# Patient Record
Sex: FEMALE | Race: WHITE | NOT HISPANIC OR LATINO | Employment: UNEMPLOYED | URBAN - NONMETROPOLITAN AREA
[De-identification: names, ages, dates, MRNs, and addresses within clinical notes are randomized per-mention and may not be internally consistent; named-entity substitution may affect disease eponyms.]

---

## 2017-04-19 ENCOUNTER — TRANSCRIBE ORDERS (OUTPATIENT)
Dept: PHYSICAL THERAPY | Facility: HOSPITAL | Age: 42
End: 2017-04-19

## 2017-04-19 DIAGNOSIS — M25.561 ACUTE PAIN OF RIGHT KNEE: Primary | ICD-10-CM

## 2017-04-26 ENCOUNTER — TRANSCRIBE ORDERS (OUTPATIENT)
Dept: PHYSICAL THERAPY | Facility: HOSPITAL | Age: 42
End: 2017-04-26

## 2017-04-26 DIAGNOSIS — M25.561 ACUTE PAIN OF RIGHT KNEE: Primary | ICD-10-CM

## 2017-04-27 ENCOUNTER — HOSPITAL ENCOUNTER (OUTPATIENT)
Dept: PHYSICAL THERAPY | Facility: HOSPITAL | Age: 42
Setting detail: THERAPIES SERIES
Discharge: HOME | End: 2017-04-27

## 2017-04-27 DIAGNOSIS — Z96.651 STATUS POST TOTAL RIGHT KNEE REPLACEMENT: Primary | ICD-10-CM

## 2017-04-27 DIAGNOSIS — M25.561 ACUTE PAIN OF RIGHT KNEE: ICD-10-CM

## 2017-04-27 PROCEDURE — 97110 THERAPEUTIC EXERCISES: CPT | Performed by: PHYSICAL THERAPIST

## 2017-04-27 PROCEDURE — G0283 ELEC STIM OTHER THAN WOUND: HCPCS | Performed by: PHYSICAL THERAPIST

## 2017-04-27 PROCEDURE — 97163 PT EVAL HIGH COMPLEX 45 MIN: CPT | Performed by: PHYSICAL THERAPIST
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
## 2017-04-28 ENCOUNTER — HOSPITAL ENCOUNTER (OUTPATIENT)
Dept: PHYSICAL THERAPY | Facility: HOSPITAL | Age: 42
Setting detail: THERAPIES SERIES
Discharge: HOME | End: 2017-04-28

## 2017-04-28 DIAGNOSIS — M25.561 ACUTE PAIN OF RIGHT KNEE: ICD-10-CM

## 2017-04-28 DIAGNOSIS — Z96.651 STATUS POST TOTAL RIGHT KNEE REPLACEMENT: Primary | ICD-10-CM

## 2017-04-28 PROCEDURE — G0283 ELEC STIM OTHER THAN WOUND: HCPCS | Performed by: SPECIALIST/TECHNOLOGIST

## 2017-04-28 PROCEDURE — 97110 THERAPEUTIC EXERCISES: CPT | Performed by: SPECIALIST/TECHNOLOGIST

## 2017-04-28 NOTE — PROGRESS NOTES
"    Outpatient Physical Therapy Ortho Treatment Note  [facility]     Patient Name: Caren Cuevas  : 1975  MRN: [id number]  Today's Date: 2017      Visit Date: 2017   Patients reports pain as:  8/10   Patient reports overall % improvement as:  \"a little\"   Patients attendance has been:  2/2   Insurance visits available   6 visits   Recert Date is:  17   Next MD visit is:  TBD       Visit Dx:    ICD-10-CM ICD-9-CM   1. Status post total right knee replacement Z96.651 V43.65   2. Acute pain of right knee M25.561 719.46       There is no problem list on file for this patient.       Past Medical History:   Diagnosis Date   • Anxiety    • Asthma    • Depression    • Diabetes mellitus    • High cholesterol    • Hypertension    • PTSD (post-traumatic stress disorder)         Past Surgical History:   Procedure Laterality Date   • CARPAL TUNNEL RELEASE Right    • CHOLECYSTECTOMY     • HAND SURGERY Left     L ligament repair   • HYSTERECTOMY     • KNEE SURGERY Right     x17+ for the R leg   • TOTAL KNEE ARTHROPLASTY Right 2017             PT Ortho       17 1100    Subjective Comments    Subjective Comments (P)  Horrible today. took pain pump out    -ML    Precautions and Contraindications    Precautions (P)  R fused ankle  -ML    Contraindications (P)  LATEX ALLERGY  -ML    Subjective Pain    Able to rate subjective pain? (P)  yes  -ML    Pre-Treatment Pain Level (P)  8  -ML    Right Knee    Extension/Flexion AROM (P)  --   -8° to 58°  -ML    Gait Assessment/Treatment    Gait, Assistive Device (P)  rolling platform walker  -ML    Gait, Gait Deviations (P)  right:;antalgic  -ML    Gait, Comment (P)  very slow guarded gait.  -ML      17 1100    Subjective Comments    Subjective Comments See patient history  -AJ    Precautions and Contraindications    Precautions R fused ankle  -AJ    Contraindications LATEX ALLERGY  -AJ    Subjective Pain    Able " to rate subjective pain? yes  -    Pre-Treatment Pain Level 6  -    Post-Treatment Pain Level 6  -    Posture/Observations    Alignment Options Genu varus  -AJ    Genu varus Right:;Moderate;Increased;Left:;Normal  -    Observations Incision healing;Edema;Ecchymosis/bruising   mild redness at medial knee  -    Posture/Observations Comments Mild distress, nerve block in place.  -    Sensation    Sensation WNL? WFL  -AJ    Light Touch Partial deficits in the RLE  -AJ    Sharp/Dull Partial deficits in the RLE  -AJ    Additional Comments Some descrese in sensation in areas of the R LE due to nerve dmamge.  -    Special Tests/Palpation    Special Tests/Palpation --   Global TTP in R knee  -AJ    Left Knee    Extension/Flexion AROM --   0°-135°  -AJ    Right Knee    Extension/Flexion AROM --   -19°-52°  -AJ    MMT (Manual Muscle Testing)    General MMT Assessment Detail Unable to SLR, grossly 2+/5 to 3-/5 in available range for R LE  -AJ    Lower Extremity    Lower Ext Manual Muscle Testing Detail L LE 5/5  -    Lower Extremity Flexibility    Hamstrings Right:;Severely limited;Left:;Mildly limited  -    Pathomechanics    Lower Extremity Pathomechanics Antalgic with midstance;Limited knees flexion with swing through;Excessive hip external rotation   R fused ankle, amkes foot flat type gait also.  -    Transfers    Transfer, Comment I with transfers, uses L LE, under R to raise leg.  -    Gait Assessment/Treatment    Gait, [place] Level conditional independence  -    Gait, Assistive Device rolling walker  -    Gait, Distance (Feet) 100  -    Gait, Gait Pattern Analysis swing-to gait  -    Gait, Gait Deviations right:;antalgic;gerber decreased;decreased heel strike;step length decreased;stride length decreased;leans to the left  -    Gait, Safety Issues weight-shifting ability decreased;step length decreased  -    Gait, Impairments pain;ROM decreased;decreased flexibility  -    Gait,  Comment Slow guarded gait, walker adjusted to correct height.  -AJ    Stairs Assessment/Treatment    Stairs, Comment Not tested.  -AJ      User Key  (r) = Recorded By, (t) = Taken By, (c) = Cosigned By    Initials Name Provider Type    МАРИНА Willams, PT Physical Therapist    ML Cesar Stanford, ATC                             PT Assessment/Plan       04/28/17 1100 04/27/17 1800    PT Assessment    Functional Limitations  Impaired gait;Impaired locomotion;Limitation in home management;Limitations in community activities;Performance in leisure activities;Performance in self-care ADL  -AJ    Impairments  Balance;Edema;Endurance;Gait;Impaired flexibility;Impaired muscle endurance;Impaired muscle length;Impaired muscle power;Posture;Poor body mechanics;Pain;Muscle strength;Motor function;Locomotion;Joint mobility;Joint integrity;Integumentary integrity;Sensation  -AJ    Assessment Comments (P)  pt did great improved ROM.  able to orin > wb stance   unable to perform supine SLR flex  -ML Patient id well with al lther ex and given written copy og HEP exercises.  -AJ    Rehab Potential  Fair  -AJ    Patient/caregiver participated in establishment of treatment plan and goals  Yes  -AJ    Patient would benefit from skilled therapy intervention  Yes  -AJ    PT Plan    PT Frequency (P)  5x/week  -ML 5x/week;3x/week;2x/week   5x/week for 2 weeks, 3x/week for 2 weeks, then 2x/wk  -AJ    Predicted Duration of Therapy Intervention (days/wks)  6-8 weeks  -AJ    Planned CPT's?  PT EVAL HIGH COMPLEXITY: 58413;PT RE-EVAL: 12543;PT THER PROC EA 15 MIN: 21593;PT THER ACT EA 15 MIN: 61710;PT MANUAL THERAPY EA 15 MIN: 93794;PT ELECTRICAL STIM UNATTEND: ;PT THER SUPP EA 15 MIN  -AJ    Physical Therapy Interventions (Optional Details)  aquatics exercise;balance training;bandaging;gait training;gross motor skills;home exercise program;joint mobilization;lumbar stabilization;modalities;manual therapy  techniques;neuromuscular re-education;orthotic fitting/training;patient/family education;postural re-education;ROM (Range of Motion);strengthening;stretching;stair training  -    PT Plan Comments (P)  progress ther-ex  -ML Progress ther ex for ROM and strength.  -      User Key  (r) = Recorded By, (t) = Taken By, (c) = Cosigned By    Initials Name Provider Type     Fide Willams, PT Physical Therapist     Ceasr Stanford, [facility]                 Modalities       04/28/17 1100          Ice    Ice Applied (P)  Yes  -ML      Location (P)  R- ant knee  -ML      Ice S/P Rx (P)  Yes  -ML      ELECTRICAL STIMULATION    Attended/Unattended (P)  Unattended  -ML      Stimulation Type (P)  IFC  -ML      Location/Electrode Placement/Other (P)  R-knee  -ML      Rx Minutes (P)  20 mins  -ML        User Key  (r) = Recorded By, (t) = Taken By, (c) = Cosigned By    Initials Name Provider Type     Cesar Stanford, [facility]                 Exercises       04/28/17 1100          Subjective Comments    Subjective Comments (P)  Horrible today. took pain pump out    -ML      Subjective Pain    Able to rate subjective pain? (P)  yes  -ML      Pre-Treatment Pain Level (P)  8  -ML      Post-Treatment Pain Level (P)  6  -ML      Exercise 1    Exercise Name 1 (P)  pro2 (rocks) s=6 LE  -ML      Time (Minutes) 1 (P)  8 min.  -ML      Exercise 2    Exercise Name 2 (P)  Heel slides w/ board & strap  -ML      Time (Minutes) 2 (P)  5 min  -ML      Exercise 3    Exercise Name 3 (P)  heel prop w/ QS  -ML      Time (Minutes) 3 (P)  5 min  -ML      Exercise 4    Exercise Name 4 (P)  Add sets w/ pillow  -ML      Reps 4 (P)  20  -ML      Time (Seconds) 4 (P)  5  sec hold  -ML      Exercise 5    Exercise Name 5 (P)  Ham sets  -ML      Reps 5 (P)  20  -ML      Time (Seconds) 5 (P)  5 sec hold  -ML      Exercise 6    Exercise Name 6 (P)  St. w/ R.W. wt shifts  -ML      Time (Minutes) 6 (P)  3 min  -ML      Exercise 7     Exercise Name 7 (P)  St. w/ R.W. alt low march  -ML      Reps 7 (P)  20  -ML      Exercise 8    Exercise Name 8 (P)  St. w/ R.W. alt hip abd  -ML      Reps 8 (P)  10  -ML        User Key  (r) = Recorded By, (t) = Taken By, (c) = Cosigned By    Initials Name Provider Type    ISABEL Stanford ATC                                PT OP Goals       04/28/17 1100       PT Short Term Goals    STG Date to Achieve (P)  05/18/17  -ML     STG 1 (P)  I with HEP and have additions/changes by next recertification.  -ML     STG 2 (P)  AROM R Knee -10° from extension.  -ML     STG 3 (P)  MALLY R knee flexion 90° or greater.  -ML     STG 4 (P)  patient able to perform SLR 20 consecutative times no lag present.  -ML     STG 5 (P)  Ambulate with RW or SC more fluid and with increased gerber.  -ML     Long Term Goals    LTG Date to Achieve (P)  06/23/17  -ML     LTG 1 (P)  AROM R knee -5° or less extension.  -ML     LTG 2 (P)  AROM R knee 115° of flexion.  -ML     LTG 3 (P)  R LE 4+/5 or greater for all directions.  -ML     LTG 4 (P)  Able to ambulate with SC or No assistive device with only gait deviation from fused ankle.  -ML     LTG 5 (P)  Able to ambulate up/down 3 steps in safest way possible x5 HRA for balance.  -ML     LTG 6 (P)  Patient able to perform sit to/from stand x5, no UE A in less than 30 seconds.  -ML     LTG 7 (P)  I with final HEP.  -ML     LTG 8 (P)  D/C with free 30day fitness formula membership.  -ML     Time Calculation    PT Goal Re-Cert Due Date (P)  05/18/17  -ML       User Key  (r) = Recorded By, (t) = Taken By, (c) = Cosigned By    Initials Name Provider Type    ISABEL Stanford ATC                 Therapy Education       04/27/17 1800          Therapy Education    Given HEP;Symptoms/condition management;Pain management;Posture/body mechanics   Plan of Care  -AJ      Program New  -AJ      How Provided Verbal;Demonstration;Written  -AJ      Provided to Patient  -AJ       Level of Understanding Verbalized;Demonstrated  -AJ        User Key  (r) = Recorded By, (t) = Taken By, (c) = Cosigned By    Initials Name Provider Type    МАРИНА Willams, PT Physical Therapist                Outcome Measures       04/27/17 1800          Lower Extremity Functional Index    Any of your usual work, housework or school activities 0  -AJ      Your usual hobbies, recreational or sporting activities 1  -AJ      Getting into or out of the bath 1  -AJ      Walking between rooms 2  -AJ      Putting on your shoes or socks 1  -AJ      Squatting 0  -AJ      Lifting an object, like a bag of groceries from the floor 2  -AJ      Performing light activities around your home 1  -AJ      Performing heavy activities around your home 0  -AJ      Getting into or out of a car 1  -AJ      Walking 2 blocks 0  -AJ      Walking a mile 0  -AJ      Going up or down 10 stairs (about 1 flight of stairs) 0  -AJ      Standing for 1 hour 0  -AJ      Sitting for 1 hour 0  -AJ      Running on even ground 0  -AJ      Running on uneven ground 0  -AJ      Making sharp turns while running fast 0  -AJ      Hopping 0  -AJ      Rolling over in bed 1  -AJ      Total 10  -AJ      Functional Assessment    Outcome Measure Options Lower Extremity Functional Scale (LEFS)  -AJ        User Key  (r) = Recorded By, (t) = Taken By, (c) = Cosigned By    Initials Name Provider Type    МАРИНА Willams, PT Physical Therapist            Time Calculation:   Start Time: (P) 1102  Stop Time: (P) 1205  Time Calculation (min): (P) 63 min  Total Timed Code Minutes- PT: (P) 38 minute(s)    Therapy Charges for Today     Code Description Service Date Service Provider Modifiers Qty    94562537975 HC PT THER PROC EA 15 MIN 4/28/2017 Cesar Stanford, ATC  3    79658091559 HC PT THER SUPP EA 15 MIN 4/28/2017 Cesar Stanford, ATC  1    56403519546 HC PT ELECTRICAL STIM UNATTENDED 4/28/2017 Cesar Stanford, ATC  1                    Cesar Irizarry  Abdoulaye, ATC  4/28/2017

## 2017-05-01 ENCOUNTER — HOSPITAL ENCOUNTER (OUTPATIENT)
Dept: PHYSICAL THERAPY | Facility: HOSPITAL | Age: 42
Setting detail: THERAPIES SERIES
Discharge: HOME | End: 2017-05-01

## 2017-05-01 DIAGNOSIS — M25.561 ACUTE PAIN OF RIGHT KNEE: ICD-10-CM

## 2017-05-01 DIAGNOSIS — Z96.651 STATUS POST TOTAL RIGHT KNEE REPLACEMENT: Primary | ICD-10-CM

## 2017-05-01 PROCEDURE — 97110 THERAPEUTIC EXERCISES: CPT | Performed by: PHYSICAL THERAPIST

## 2017-05-01 PROCEDURE — G0283 ELEC STIM OTHER THAN WOUND: HCPCS | Performed by: PHYSICAL THERAPIST

## 2017-05-02 ENCOUNTER — APPOINTMENT (OUTPATIENT)
Dept: PHYSICAL THERAPY | Facility: HOSPITAL | Age: 42
End: 2017-05-02

## 2017-05-03 ENCOUNTER — HOSPITAL ENCOUNTER (OUTPATIENT)
Dept: PHYSICAL THERAPY | Facility: HOSPITAL | Age: 42
Setting detail: THERAPIES SERIES
Discharge: HOME | End: 2017-05-03

## 2017-05-03 DIAGNOSIS — Z96.651 STATUS POST TOTAL RIGHT KNEE REPLACEMENT: Primary | ICD-10-CM

## 2017-05-03 DIAGNOSIS — M25.561 ACUTE PAIN OF RIGHT KNEE: ICD-10-CM

## 2017-05-03 PROCEDURE — G0283 ELEC STIM OTHER THAN WOUND: HCPCS

## 2017-05-03 PROCEDURE — 97110 THERAPEUTIC EXERCISES: CPT

## 2017-05-04 ENCOUNTER — APPOINTMENT (OUTPATIENT)
Dept: PHYSICAL THERAPY | Facility: HOSPITAL | Age: 42
End: 2017-05-04

## 2017-05-05 ENCOUNTER — APPOINTMENT (OUTPATIENT)
Dept: PHYSICAL THERAPY | Facility: HOSPITAL | Age: 42
End: 2017-05-05

## 2017-05-08 ENCOUNTER — APPOINTMENT (OUTPATIENT)
Dept: PHYSICAL THERAPY | Facility: HOSPITAL | Age: 42
End: 2017-05-08

## 2017-05-10 ENCOUNTER — HOSPITAL ENCOUNTER (OUTPATIENT)
Dept: PHYSICAL THERAPY | Facility: HOSPITAL | Age: 42
Setting detail: THERAPIES SERIES
Discharge: HOME | End: 2017-05-10

## 2017-05-10 DIAGNOSIS — Z96.651 STATUS POST TOTAL RIGHT KNEE REPLACEMENT: Primary | ICD-10-CM

## 2017-05-10 DIAGNOSIS — M25.561 ACUTE PAIN OF RIGHT KNEE: ICD-10-CM

## 2017-05-10 PROCEDURE — 97110 THERAPEUTIC EXERCISES: CPT

## 2017-05-10 PROCEDURE — G0283 ELEC STIM OTHER THAN WOUND: HCPCS

## 2017-05-12 ENCOUNTER — APPOINTMENT (OUTPATIENT)
Dept: PHYSICAL THERAPY | Facility: HOSPITAL | Age: 42
End: 2017-05-12

## 2017-05-18 ENCOUNTER — APPOINTMENT (OUTPATIENT)
Dept: PHYSICAL THERAPY | Facility: HOSPITAL | Age: 42
End: 2017-05-18

## 2017-05-19 ENCOUNTER — APPOINTMENT (OUTPATIENT)
Dept: PHYSICAL THERAPY | Facility: HOSPITAL | Age: 42
End: 2017-05-19

## 2017-05-30 ENCOUNTER — HOSPITAL ENCOUNTER (OUTPATIENT)
Dept: PHYSICAL THERAPY | Facility: HOSPITAL | Age: 42
Setting detail: THERAPIES SERIES
Discharge: HOME | End: 2017-05-30

## 2017-05-30 DIAGNOSIS — Z96.651 STATUS POST TOTAL RIGHT KNEE REPLACEMENT: Primary | ICD-10-CM

## 2017-05-30 PROCEDURE — 97110 THERAPEUTIC EXERCISES: CPT | Performed by: PHYSICAL THERAPIST

## 2017-05-31 ENCOUNTER — APPOINTMENT (OUTPATIENT)
Dept: PHYSICAL THERAPY | Facility: HOSPITAL | Age: 42
End: 2017-05-31

## 2017-06-01 ENCOUNTER — HOSPITAL ENCOUNTER (OUTPATIENT)
Dept: PHYSICAL THERAPY | Facility: HOSPITAL | Age: 42
Setting detail: THERAPIES SERIES
Discharge: HOME | End: 2017-06-01

## 2017-06-01 DIAGNOSIS — Z96.651 STATUS POST TOTAL RIGHT KNEE REPLACEMENT: Primary | ICD-10-CM

## 2017-06-01 DIAGNOSIS — M25.561 ACUTE PAIN OF RIGHT KNEE: ICD-10-CM

## 2017-06-01 PROCEDURE — 97110 THERAPEUTIC EXERCISES: CPT

## 2017-06-01 NOTE — THERAPY TREATMENT NOTE
Outpatient Physical Therapy Ortho Treatment Note  [facility]     Patient Name: Caren Cuevas  : 1975  MRN: [id number]  Today's Date: 2017      Visit Date: 2017   Visits:7/10 visits  Approved visits:6 visits/24 units  M.D. Appt:2017  Re-cert:2017  Pain:before 2/10, after 2/10    Visit Dx:    ICD-10-CM ICD-9-CM   1. Status post total right knee replacement Z96.651 V43.65   2. Acute pain of right knee M25.561 719.46       There is no problem list on file for this patient.       Past Medical History:   Diagnosis Date   • Anxiety    • Asthma    • Depression    • Diabetes mellitus    • High cholesterol    • Hypertension    • PTSD (post-traumatic stress disorder)         Past Surgical History:   Procedure Laterality Date   • CARPAL TUNNEL RELEASE Right    • CHOLECYSTECTOMY     • HAND SURGERY Left     L ligament repair   • HYSTERECTOMY     • KNEE SURGERY Right     x17+ for the R leg   • TOTAL KNEE ARTHROPLASTY Right 2017             PT Ortho       17 1000    Subjective Comments    Subjective Comments pt stated R knee 2/10,R hip 8/10  -TL    Precautions and Contraindications    Contraindications LATEX ALLERGY  -TL    Subjective Pain    Able to rate subjective pain? yes  -TL    Posture/Observations    Posture/Observations Comments pt incision on right healing with some reddness and one scab area. No signs of infection this date.  -TL      17 1300    Subjective Comments    Subjective Comments 43 y/o female 4.5 weeks S/P  R TKA (17), was on hold fof therapy due to healing incision and illness.  Pt presents today relating  incision is almost closed, small scab in place. State has gently restarted her HEP. States has minimal pain in the knee today. States most of her pain is the R hip, she plans to have the hip addressed once the knee is healed.   -LF    Precautions and Contraindications    Contraindications LATEX allergy  -LF     Posture/Observations    Posture/Observations Comments incision 90% closed, smal area of eschar in place aprox 1/2 inchin lenght, no redness noted  -LF    General LE Assessment    ROM Detail R knee active ext -8, passive 0;  active flexion 100, passive 105  -LF    MMT (Manual Muscle Testing)    General MMT Assessment Detail MMT: HS 4, quads 4  -LF    Transfers    Transfers, Sit-Stand [place] independent   uses L LE > R  -LF    Transfers, Stand-Sit [place] independent   uses L LE> R  -LF    Gait Assessment/Treatment    Gait, Comment antalgic gait, wobbles to R when walking, due to c/o R hip pain, trace R knee flexion in stance  -LF      User Key  (r) = Recorded By, (t) = Taken By, (c) = Cosigned By    Initials Name Provider Type     Bernadette Ernandez, PT Physical Therapist    JAMES Pierce PTA Physical Therapy Assistant                            PT Assessment/Plan       06/01/17 1000       PT Assessment    Assessment Comments pt progressing toward goals. Pt without difficulty with new HEp. Pt having trouble with pain in right hip. Pt gait antilagic . Pt needs cues to keep right leg neutral vs out to the side.  -TL     PT Plan    PT Frequency 2x/week  -TL     PT Plan Comments start pool when wound heals  -TL       User Key  (r) = Recorded By, (t) = Taken By, (c) = Cosigned By    Initials Name Provider Type    JAMES Pierce PTA Physical Therapy Assistant                Modalities       06/01/17 1000          Subjective Pain    Pre-Treatment Pain Level 2  -TL      Post-Treatment Pain Level 2  -TL      Ice    Patient denies application of Ice Yes  -TL        User Key  (r) = Recorded By, (t) = Taken By, (c) = Cosigned By    Initials Name Provider Type    JAMES Pierce PTA Physical Therapy Assistant                Exercises       06/01/17 1000          Subjective Comments    Subjective Comments pt stated R knee 2/10,R hip 8/10  -TL      Subjective Pain    Able to rate subjective pain? yes  -TL       Pre-Treatment Pain Level 2  -TL      Post-Treatment Pain Level 2  -TL      Aquatics    Aquatics performed? No  -TL      Exercise 1    Exercise Name 1 Pro 11 LE level 3 full revolutions  -TL      Time (Minutes) 1 10 mins  -TL      Exercise 2    Exercise Name 2 [address] [address] [facility]  -TL      Reps 2 2  -TL      Time (Seconds) 2 30 sec hold  -TL      Exercise 3    Exercise Name 3 st lunge st  -TL      Sets 3 5  -TL      Reps 3 --   10 sec hold  -TL      Exercise 4    Exercise Name 4 Sitting heelslides  -TL      Reps 4 20  -TL      Time (Seconds) 4 5 sec hold  -TL      Exercise 5    Exercise Name 5 LAQ with hold  -TL      Sets 5 1  -TL      Reps 5 Other   14 reps  -TL      Time (Seconds) 5 5 sec hold  -TL      Exercise 6    Exercise Name 6 quad set with towel roll under heel  -TL      Sets 6 2  -TL      Reps 6 15  -TL      Time (Seconds) 6 5 sec hold  -TL      Exercise 7    Exercise Name 7 supine heelslides  -TL      Reps 7 20  -TL      Time (Seconds) 7 5 sec hold  -TL      Exercise 8    Exercise Name 8 T-Band non latex supine knee push downs with towel roll under knee with quad set  -TL      Sets 8 1  -TL      Reps 8 30  -TL      Exercise 9    Exercise Name 9 shuttle leg press right leg  -TL      Weights/Plates 9 --   7 bands  -TL      Sets 9 2  -TL      Reps 9 20  -TL        User Key  (r) = Recorded By, (t) = Taken By, (c) = Cosigned By    Initials Name Provider Type    TL Eri Pierce, PTA Physical Therapy Assistant                               PT OP Goals       06/01/17 1000       PT Short Term Goals    STG Date to Achieve 06/13/17  -TL     STG 2 AROM R Knee : 0 extension.  -TL     STG 2 Progress New  -TL     STG 3 Pt will perform MMT R quads 4-4+/5, min c/o painin R knee.   -TL     STG 3 Progress Progressing  -TL     STG 4 Pt will demonstrate 75% quad recruitment with quad set  -TL     STG 4 Progress Progressing;New  -TL     STG 5 Pt will be Iin HEP each sesion.   -TL     STG 5 Progress Progressing  -TL     Long Term  Goals    LTG Date to Achieve 06/27/17  -TL     LTG 2 AROM R knee 115° of flexion.  -TL     LTG 2 Progress Progressing  -TL     LTG 3 R LE 4+/5 or greater for all directions.  -TL     LTG 3 Progress Ongoing  -TL     LTG 5 Able to ambulate up/down 3 steps in safest way possible x5 HRA for balance.  -TL     LTG 5 Progress Ongoing  -TL     LTG 6 Patient able to perform sit to/from stand x5, no UE A in less than 30 seconds.  -TL     LTG 6 Progress Ongoing  -TL     LTG 7 I with final HEP.  -TL     LTG 7 Progress Ongoing  -TL     LTG 8 D/C with free 30day fitness formula membership.  -TL     LTG 8 Progress Ongoing  -TL     Time Calculation    PT Goal Re-Cert Due Date 06/20/17  -TL       User Key  (r) = Recorded By, (t) = Taken By, (c) = Cosigned By    Initials Name Provider Type    JAMES Pierce PTA Physical Therapy Assistant                Therapy Education       06/01/17 1000          Therapy Education    Given HEP   Knee push downs with non Latex blue Tband  -TL      Program New  -TL      How Provided Verbal;Demonstration  -TL      Provided to Patient  -TL      Level of Understanding Verbalized;Demonstrated  -TL        User Key  (r) = Recorded By, (t) = Taken By, (c) = Cosigned By    Initials Name Provider Type    JAMES Pierce PTA Physical Therapy Assistant                Outcome Measures       05/30/17 1400          Lower Extremity Functional Index    Any of your usual work, housework or school activities 2  -LF      Your usual hobbies, recreational or sporting activities 2  -LF      Getting into or out of the bath 3  -LF      Walking between rooms 3  -LF      Putting on your shoes or socks 4  -LF      Squatting 0  -LF      Lifting an object, like a bag of groceries from the floor 4  -LF      Performing light activities around your home 3  -LF      Performing heavy activities around your home 1  -LF      Getting into or out of a car 3  -LF      Walking 2 blocks 1  -LF      Walking a mile 0  -LF      Going  up or down 10 stairs (about 1 flight of stairs) 4  -LF      Standing for 1 hour 1  -LF      Sitting for 1 hour 1  -LF      Running on even ground 0  -LF      Running on uneven ground 0  -LF      Making sharp turns while running fast 0  -LF      Hopping 0  -LF      Rolling over in bed 4  -LF      Total 36  -LF      Functional Assessment    Outcome Measure Options Lower Extremity Functional Scale (LEFS)  -LF        User Key  (r) = Recorded By, (t) = Taken By, (c) = Cosigned By    Initials Name Provider Type    LF Bernadette Ernandez, PT Physical Therapist            Time Calculation:   Start Time: 1008  Stop Time: 1057  Time Calculation (min): 49 min  Total Timed Code Minutes- PT: 49 minute(s)    Therapy Charges for Today     Code Description Service Date Service Provider Modifiers Qty    15620549366  PT THER PROC EA 15 MIN 6/1/2017 Eri Pierce, PTA GP 3                    Eri Pierce PTA  6/1/2017

## 2017-06-05 ENCOUNTER — APPOINTMENT (OUTPATIENT)
Dept: PHYSICAL THERAPY | Facility: HOSPITAL | Age: 42
End: 2017-06-05

## 2017-06-06 ENCOUNTER — HOSPITAL ENCOUNTER (OUTPATIENT)
Dept: PHYSICAL THERAPY | Facility: HOSPITAL | Age: 42
Setting detail: THERAPIES SERIES
Discharge: HOME | End: 2017-06-06

## 2017-06-06 DIAGNOSIS — Z96.651 STATUS POST TOTAL RIGHT KNEE REPLACEMENT: Primary | ICD-10-CM

## 2017-06-06 DIAGNOSIS — M25.561 ACUTE PAIN OF RIGHT KNEE: ICD-10-CM

## 2017-06-06 PROCEDURE — 97110 THERAPEUTIC EXERCISES: CPT | Performed by: SPECIALIST/TECHNOLOGIST

## 2017-06-06 NOTE — THERAPY TREATMENT NOTE
"    Outpatient Physical Therapy Ortho Treatment Note  [facility]     Patient Name: Caren Cuevas  : 1975  MRN: [id number]  Today's Date: 2017      Visit Date: 2017   Patients reports pain as:  2/10 @ knee   Patient reports overall % improvement as:  90%   Patients attendance has been:     Insurance visits available   Per need   Recert Date is:  17   Next MD visit is:  17     Visit Dx:    ICD-10-CM ICD-9-CM   1. Status post total right knee replacement Z96.651 V43.65   2. Acute pain of right knee M25.561 719.46       There is no problem list on file for this patient.       Past Medical History:   Diagnosis Date   • Anxiety    • Asthma    • Depression    • Diabetes mellitus    • High cholesterol    • Hypertension    • PTSD (post-traumatic stress disorder)         Past Surgical History:   Procedure Laterality Date   • CARPAL TUNNEL RELEASE Right    • CHOLECYSTECTOMY     • HAND SURGERY Left     L ligament repair   • HYSTERECTOMY     • KNEE SURGERY Right     x17+ for the R leg   • TOTAL KNEE ARTHROPLASTY Right 2017             PT Ortho       17 0900    Precautions and Contraindications    Contraindications (P)  LATEX ALLERGY  -ML    Subjective Pain    Able to rate subjective pain? (P)  yes  -ML    Posture/Observations    Posture/Observations Comments (P)  no redness.  one minor scabbed area at distal 1/3 of incision.  no drainage.   -ML      User Key  (r) = Recorded By, (t) = Taken By, (c) = Cosigned By    Initials Name Provider Type     Cesar Stanford, ATC                             PT Assessment/Plan       17 1000       PT Assessment    Assessment Comments (P)  c/o 10/10 hip pain with smile.  cues cont'd with right toe out w/ step downs.  able to complete \"i havent done that in 10 years\"   able to complete all ther-ex without signif difficulty  -ML     PT Plan    PT Frequency (P)  2x/week  -ML     PT Plan " Comments (P)  start aquatics once wound heals   MD progress note next visit.    -ML       User Key  (r) = Recorded By, (t) = Taken By, (c) = Cosigned By    Initials Name Provider Type    ISABEL Stanford ATC                 Modalities       06/06/17 0900          Ice    Patient denies application of Ice (P)  Yes  -ML        User Key  (r) = Recorded By, (t) = Taken By, (c) = Cosigned By    Initials Name Provider Type    ML Cesar Stanford ATC                 Exercises       06/06/17 0900          Subjective Comments    Subjective Comments (P)  95% better. hip stays flared.  knee 2/10 and 10/10 at hip  -ML      Subjective Pain    Able to rate subjective pain? (P)  yes  -ML      Pre-Treatment Pain Level (P)  2  -ML      Post-Treatment Pain Level (P)  3  -ML      Exercise 1    Exercise Name 1 (P)  PRO 2- LE  -ML      Time (Minutes) 1 (P)  10 min.   -ML      Exercise 2    Exercise Name 2 (P)  B- St Ham S.   -ML      Sets 2 (P)  2  -ML      Time (Seconds) 2 (P)  30 sec  -ML      Exercise 3    Exercise Name 3 (P)  St Lunge S  -ML      Sets 3 (P)  5  -ML      Time (Seconds) 3 (P)  10 sec  -ML      Exercise 4    Exercise Name 4 (P)  Seated Heelslide (w/ board)  -ML      Sets 4 (P)  2  -ML      Reps 4 (P)  20  -ML      Exercise 5    Exercise Name 5 (P)  SAQ over bolster  -ML      Sets 5 (P)  2  -ML      Reps 5 (P)  20  -ML      Exercise 6    Exercise Name 6 (P)  Shuttle 1 LP  -ML      Resistance 6 (P)  --   7 crd  -ML      Sets 6 (P)  3  -ML      Reps 6 (P)  15  -ML      Exercise 7    Exercise Name 7 (P)  LAQ  -ML      Sets 7 (P)  2  -ML      Reps 7 (P)  20  -ML      Exercise 8    Exercise Name 8 (P)  QS w/ towel heel prop  -ML      Sets 8 (P)  3  -ML      Reps 8 (P)  10  -ML        User Key  (r) = Recorded By, (t) = Taken By, (c) = Cosigned By    Initials Name Provider Type    ML Cesar Stanford ATC                                PT OP Goals       06/06/17 1000       PT  Short Term Goals    STG Date to Achieve (P)  06/13/17  -ML     STG 2 (P)  AROM R Knee : 0 extension.  -ML     STG 2 Progress (P)  Met  -ML     STG 3 (P)  Pt will perform MMT R quads 4-4+/5, min c/o painin R knee.   -ML     STG 3 Progress (P)  Progressing  -ML     STG 4 (P)  Pt will demonstrate 75% quad recruitment with quad set  -ML     STG 4 Progress (P)  Progressing;New  -ML     STG 5 (P)  Pt will be Iin HEP each sesion.   -ML     STG 5 Progress (P)  Progressing  -ML     Long Term Goals    LTG Date to Achieve (P)  06/27/17  -ML     LTG 2 (P)  AROM R knee 115° of flexion.  -ML     LTG 2 Progress (P)  Progressing  -ML     LTG 3 (P)  R LE 4+/5 or greater for all directions.  -ML     LTG 3 Progress (P)  Ongoing  -ML     LTG 5 (P)  Able to ambulate up/down 3 steps in safest way possible x5 HRA for balance.  -ML     LTG 5 Progress (P)  Ongoing  -ML     LTG 6 (P)  Patient able to perform sit to/from stand x5, no UE A in less than 30 seconds.  -ML     LTG 6 Progress (P)  Ongoing  -ML     LTG 7 (P)  I with final HEP.  -ML     LTG 7 Progress (P)  Ongoing  -ML     LTG 8 (P)  D/C with free 30day fitness formula membership.  -ML     LTG 8 Progress (P)  Ongoing  -ML     Time Calculation    PT Goal Re-Cert Due Date (P)  06/20/17  -ML       User Key  (r) = Recorded By, (t) = Taken By, (c) = Cosigned By    Initials Name Provider Type    ISABEL Stanford ATC                     Time Calculation:   Start Time: (P) 0943  Stop Time: (P) 1027  Time Calculation (min): (P) 44 min    Therapy Charges for Today     Code Description Service Date Service Provider Modifiers Qty    56325370647 HC PT THER PROC EA 15 MIN 6/6/2017 Cesar Stanford, ATC  3                    Cesar Stanford ATC  6/6/2017

## 2017-06-08 ENCOUNTER — HOSPITAL ENCOUNTER (OUTPATIENT)
Dept: PHYSICAL THERAPY | Facility: HOSPITAL | Age: 42
Setting detail: THERAPIES SERIES
Discharge: HOME | End: 2017-06-08

## 2017-06-08 DIAGNOSIS — M25.561 ACUTE PAIN OF RIGHT KNEE: ICD-10-CM

## 2017-06-08 DIAGNOSIS — Z96.651 STATUS POST TOTAL RIGHT KNEE REPLACEMENT: Primary | ICD-10-CM

## 2017-06-08 PROCEDURE — 97110 THERAPEUTIC EXERCISES: CPT

## 2017-06-08 NOTE — THERAPY TREATMENT NOTE
Outpatient Physical Therapy Ortho Treatment Note  [facility]     Patient Name: Caren Cuevas  : 1975  MRN: [id number]  Today's Date: 2017      Visit Date: 2017  Visits:visits  Approved visits: per need. Through 2017  Re-Cert:2017  RTD: 2017  Subjective Improvement:75%  Visit Dx:    ICD-10-CM ICD-9-CM   1. Status post total right knee replacement Z96.651 V43.65   2. Acute pain of right knee M25.561 719.46       There is no problem list on file for this patient.       Past Medical History:   Diagnosis Date   • Anxiety    • Asthma    • Depression    • Diabetes mellitus    • High cholesterol    • Hypertension    • PTSD (post-traumatic stress disorder)         Past Surgical History:   Procedure Laterality Date   • CARPAL TUNNEL RELEASE Right    • CHOLECYSTECTOMY     • HAND SURGERY Left     L ligament repair   • HYSTERECTOMY     • KNEE SURGERY Right     x17+ for the R leg   • TOTAL KNEE ARTHROPLASTY Right 2017             PT Ortho       17 1300    Right Knee    Extension/Flexion AROM --   sitting flexion arom 110, ext supine 0  -TL      17 1000    Precautions and Contraindications    Contraindications Latex Allergy  -TL    Subjective Pain    Able to rate subjective pain? yes  -TL    Pre-Treatment Pain Level 4  -TL    Post-Treatment Pain Level 2  -TL    Posture/Observations    Posture/Observations Comments Wound healing with scabbing but no signs of infections  -TL      17 0900    Precautions and Contraindications    Contraindications (P)  LATEX ALLERGY  -ML    Subjective Pain    Able to rate subjective pain? (P)  yes  -ML    Posture/Observations    Posture/Observations Comments (P)  no redness.  one minor scabbed area at distal 1/3 of incision.  no drainage.   -      User Key  (r) = Recorded By, (t) = Taken By, (c) = Cosigned By    Initials Name Provider Type    ISABEL Stanford, ERON     JAMES FLORES  YAMEL Pierce Physical Therapy Assistant                            PT Assessment/Plan       06/08/17 1020       PT Assessment    Assessment Comments Pt comes in today c/o increase pain with gait. pt stated that she was fine yesterday but must have rotated leg at night secondary to pt woke up with pain. Pt having trouble with ext. PTA had to cue pt not to hyperext right knee with stretches or gait.  Pt has improved with ROM. Right knee healing without signs of infection or reddness. Pt progressing toward goals but no knee goals met this date. Treatment focused on strengthening and ROM.  -TL     PT Plan    PT Frequency 2x/week  -TL     PT Plan Comments Start agquatics next week if wound heals. Start hip abd in standing and mini squats, sit to stands without UE  -TL       User Key  (r) = Recorded By, (t) = Taken By, (c) = Cosigned By    Initials Name Provider Type    JAMES Pierce PTA Physical Therapy Assistant                Modalities       06/08/17 1000          Ice    Ice Applied Yes  -TL      Location right knee  -TL      Rx Minutes 15 mins  -TL      Ice S/P Rx Yes  -TL        User Key  (r) = Recorded By, (t) = Taken By, (c) = Cosigned By    Initials Name Provider Type    JAMES Pierce PTA Physical Therapy Assistant                Exercises       06/08/17 1000          Subjective Pain    Able to rate subjective pain? yes  -TL      Pre-Treatment Pain Level 4  -TL      Post-Treatment Pain Level 2  -TL      Exercise 1    Exercise Name 1 Pro 2 level 4.5  -TL      Time (Minutes) 1 10 mins  -TL      Exercise 2    Exercise Name 2 gale St HAM s  -TL      Sets 2 2  -TL      Time (Seconds) 2 30 sec hold  -TL      Exercise 3    Exercise Name 3 St Lunge ST  -TL      Sets 3 1  -TL      Reps 3 10  -TL      Time (Seconds) 3 10 sec hold  -TL      Exercise 4    Exercise Name 4 St. marching alternating  -TL      Cueing 4 Verbal;Tactile   Pt keep pt from hyperextending right knee  -TL      Sets 4 1  -TL      Reps 4 15   -TL      Exercise 5    Exercise Name 5 Supine heelslides  -TL      Sets 5 1  -TL      Reps 5 20  -TL      Exercise 6    Exercise Name 6 SAQ  -TL      Sets 6 2  -TL      Reps 6 20  -TL      Time (Seconds) 6 5 sec hold  -TL      Exercise 7    Exercise Name 7 semireclined SLR flexion  -TL      Sets 7 3  -TL      Reps 7 5  -TL      Time (Seconds) 7 3 sec hold  -TL      Exercise 8    Exercise Name 8 LAQ with ball squeeses for add  -TL      Sets 8 2  -TL      Reps 8 10  -TL      Time (Seconds) 8 5 sec hold  -TL        User Key  (r) = Recorded By, (t) = Taken By, (c) = Cosigned By    Initials Name Provider Type    JAMES Pierce, PTA Physical Therapy Assistant                               PT OP Goals       06/08/17 1020       PT Short Term Goals    STG Date to Achieve 06/13/17  -TL     STG 2 AROM R Knee : 0 extension.  -TL     STG 2 Progress Met  -TL     STG 3 Pt will perform MMT R quads 4-4+/5, min c/o painin R knee.   -TL     STG 3 Progress Progressing  -TL     STG 4 Pt will demonstrate 75% quad recruitment with quad set  -TL     STG 4 Progress Progressing;New  -TL     STG 5 Pt will be Iin HEP each sesion.   -TL     STG 5 Progress Progressing  -TL     Long Term Goals    LTG Date to Achieve 06/27/17  -TL     LTG 2 AROM R knee 115° of flexion.  -TL     LTG 2 Progress Progressing  -TL     LTG 3 R LE 4+/5 or greater for all directions.  -TL     LTG 3 Progress Ongoing  -TL     LTG 5 Able to ambulate up/down 3 steps in safest way possible x5 HRA for balance.  -TL     LTG 5 Progress Ongoing  -TL     LTG 6 Patient able to perform sit to/from stand x5, no UE A in less than 30 seconds.  -TL     LTG 6 Progress Ongoing  -TL     LTG 7 I with final HEP.  -TL     LTG 7 Progress Ongoing  -TL     LTG 8 D/C with free 30day fitness formula membership.  -TL     LTG 8 Progress Ongoing  -TL     Time Calculation    PT Goal Re-Cert Due Date 06/20/17  -TL       User Key  (r) = Recorded By, (t) = Taken By, (c) = Cosigned By    Initials Name  Provider Type    TL Eri Pierce PTA Physical Therapy Assistant                Therapy Education       06/08/17 1020          Therapy Education    Given Pain management  -TL      Program Reinforced  -TL      How Provided Verbal;Demonstration  -TL      Provided to Patient  -TL      Level of Understanding Verbalized  -TL        User Key  (r) = Recorded By, (t) = Taken By, (c) = Cosigned By    Initials Name Provider Type    TL Eri Pierce PTA Physical Therapy Assistant                Time Calculation:   Start Time: 1020  Stop Time: 1122  Time Calculation (min): 62 min  PT Non-Billable Time (min):  (15 mins)  Total Timed Code Minutes- PT: 49 minute(s)    Therapy Charges for Today     Code Description Service Date Service Provider Modifiers Qty    25440573443 HC PT THER PROC EA 15 MIN 6/8/2017 Eri Pierce PTA GP 3    05798172226 HC PT THER SUPP EA 15 MIN 6/8/2017 Eri Pierce PTA GP 1                    Eri Pierce PTA  6/8/2017

## 2017-06-13 ENCOUNTER — APPOINTMENT (OUTPATIENT)
Dept: PHYSICAL THERAPY | Facility: HOSPITAL | Age: 42
End: 2017-06-13

## 2017-06-16 ENCOUNTER — HOSPITAL ENCOUNTER (OUTPATIENT)
Dept: PHYSICAL THERAPY | Facility: HOSPITAL | Age: 42
Setting detail: THERAPIES SERIES
Discharge: HOME | End: 2017-06-16

## 2017-06-16 DIAGNOSIS — M25.561 ACUTE PAIN OF RIGHT KNEE: ICD-10-CM

## 2017-06-16 DIAGNOSIS — Z96.651 STATUS POST TOTAL RIGHT KNEE REPLACEMENT: Primary | ICD-10-CM

## 2017-06-16 PROCEDURE — 97110 THERAPEUTIC EXERCISES: CPT

## 2017-06-16 NOTE — THERAPY TREATMENT NOTE
"    Outpatient Physical Therapy Ortho Treatment Note  [facility]  Eneida Mukherjee PTA       Patient Name: Caren Cuevas  : 1975  MRN: [id number]  Today's Date: 2017      Visit Date: 2017     Visits: 10/15  Insurance Visits Approved: 2 remain after this treatment.   Recert Due: 2017  MD Appt: 2 months  Pain: pretreatment 0/10; post treatment 3/10  Improvement: pt is subjectively reporting 75% improvement since initial evaluation    Visit Dx:    ICD-10-CM ICD-9-CM   1. Status post total right knee replacement Z96.651 V43.65   2. Acute pain of right knee M25.561 719.46       There is no problem list on file for this patient.       Past Medical History:   Diagnosis Date   • Anxiety    • Asthma    • Depression    • Diabetes mellitus    • High cholesterol    • Hypertension    • PTSD (post-traumatic stress disorder)         Past Surgical History:   Procedure Laterality Date   • CARPAL TUNNEL RELEASE Right    • CHOLECYSTECTOMY     • HAND SURGERY Left     L ligament repair   • HYSTERECTOMY     • KNEE SURGERY Right     x17+ for the R leg   • TOTAL KNEE ARTHROPLASTY Right 2017             PT Ortho       17 1030    Subjective Pain    Able to rate subjective pain? yes  -    Pre-Treatment Pain Level 3  -    Post-Treatment Pain Level 3  -MH      17 1000    Subjective Comments    Subjective Comments states that she is feeling good right now. just work up.   -    Subjective Pain    Able to rate subjective pain? yes  -    Pre-Treatment Pain Level 0  -    Post-Treatment Pain Level --   \"stiff\"  -      User Key  (r) = Recorded By, (t) = Taken By, (c) = Cosigned By    Initials Name Provider Type     Eneida Mukherjee PTA Physical Therapy Assistant                            PT Assessment/Plan       17 1000       PT Assessment    Assessment Comments patient demonstrates poor eccentric control on steps today. has increased pain after " "changing between pool treatment and land treatment.   -     PT Plan    PT Frequency 2x/week  -     PT Plan Comments next aquatics deep scissors, flutter kick. keep treatment to 5 units per insurance restrictions. continue to work steps working on eccentric control.   -       User Key  (r) = Recorded By, (t) = Taken By, (c) = Cosigned By    Initials Name Provider Type     Eneida Mukherjee PTA Physical Therapy Assistant                Modalities       06/16/17 1030          Ice    Ice Applied Yes  -      Location right knee  -      Rx Minutes 15 mins  -      Ice S/P Rx Yes  -MH        User Key  (r) = Recorded By, (t) = Taken By, (c) = Cosigned By    Initials Name Provider Type     Eneida Mukherjee PTA Physical Therapy Assistant                Exercises       06/16/17 1030 06/16/17 1000       Subjective Comments    Subjective Comments  states that she is feeling good right now. just work up.   -     Subjective Pain    Able to rate subjective pain? yes  - yes  -     Pre-Treatment Pain Level 3  -MH 0  -MH     Post-Treatment Pain Level 3  -MH --   \"stiff\"  -     Aquatics    Aquatics performed?  Yes  -     Exercise 1    Exercise Name 1 Pro II LE  -MH AQUA: Walk Fwd/Retro?Lat  -     Resistance 1 --   L 5.0  -MH      Time (Minutes) 1 10 mins  - 5 mins each  -     Exercise 2    Exercise Name 2 B St. HS S  -MH AQUA: Heel Raises  -     Reps 2 2  -MH 20  -MH     Time (Seconds) 2 30 sec hold  -MH      Exercise 3    Exercise Name 3 R St. Lunge S  -MH AQUA: Mini Squats  -     Reps 3 10  -MH 20  -MH     Time (Seconds) 3 10 sec hold  -MH      Exercise 4    Exercise Name 4 Step Up Fwd  -MH AQUA: B St. Hip 3way  -     Equipment 4 --   6 inch  -MH      Sets 4 2  -MH      Reps 4 10  -MH 20  -MH     Exercise 5    Exercise Name 5 Step Up Lat  -MH AQUA: B alt Hamcurls  -     Equipment 5 --   4 inch  -MH      Sets 5 2  -MH      Reps 5 10  -MH 20  -MH     Exercise 6    Exercise Name 6 Rev Step Up/Fwd Step " Down  - AQUA: Deep Bicycle  -     Equipment 6 --   4 inch  -      Reps 6 10  -      Time (Minutes) 6  5 mins  -       User Key  (r) = Recorded By, (t) = Taken By, (c) = Cosigned By    Initials Name Provider Type     Eneida Mukherjee PTA Physical Therapy Assistant                               PT OP Goals       06/16/17 1030 06/16/17 1000    PT Short Term Goals    STG Date to Achieve  06/13/17  -    STG 2  AROM R Knee : 0 extension.  -    STG 2 Progress  Met  -    STG 3  Pt will perform MMT R quads 4-4+/5, min c/o painin R knee.   -    STG 3 Progress  Progressing  -    STG 4  Pt will demonstrate 75% quad recruitment with quad set  -    STG 4 Progress  Progressing;New  -    STG 5  Pt will be Iin HEP each sesion.   -    STG 5 Progress  Progressing  -    Long Term Goals    LTG Date to Achieve  06/27/17  -    LTG 2  AROM R knee 115° of flexion.  -    LTG 2 Progress  Progressing  -    LTG 3  R LE 4+/5 or greater for all directions.  -    LTG 3 Progress  Ongoing  -    LTG 5  Able to ambulate up/down 3 steps in safest way possible x5 HRA for balance.  -    LTG 5 Progress  Ongoing  -    LTG 6  Patient able to perform sit to/from stand x5, no UE A in less than 30 seconds.  -    LTG 6 Progress  Ongoing  -    LTG 7  I with final HEP.  -    LTG 7 Progress  Ongoing  -    LTG 8  D/C with free 30day fitness formula membership.  -    LTG 8 Progress  Ongoing  -    Time Calculation    PT Goal Re-Cert Due Date 06/20/17  - 06/20/17  -      User Key  (r) = Recorded By, (t) = Taken By, (c) = Cosigned By    Initials Name Provider Type    SHWETA Mukherjee PTA Physical Therapy Assistant                Therapy Education       06/16/17 1000          Therapy Education    Given HEP;Symptoms/condition management;Pain management  -      Program Reinforced  -      How Provided Verbal  -      Provided to Patient  -      Level of Understanding Verbalized  -        User Key  (r) =  Recorded By, (t) = Taken By, (c) = Cosigned By    Initials Name Provider Type     Eneida Mukherjee PTA Physical Therapy Assistant                Time Calculation:   Start Time: 0940  Stop Time: 1020  Time Calculation (min): 40 min  Total Timed Code Minutes- PT: 40 minute(s)  Start Time: 1030  Stop Time: 1118  Time Calculation (min): 48 min  PT Non-Billable Time (min): 15 min  Total Timed Code Minutes- PT: 33 minute(s)     Total Billable time is 73 minutes     Therapy Charges for Today     Code Description Service Date Service Provider Modifiers Qty    85523388914 HC PT THER PROC EA 15 MIN 6/16/2017 Eneida Mukherjee PTA GP 5    87771189524 HC PT THER SUPP EA 15 MIN 6/16/2017 Eneida Mukherjee PTA GP 1                    Eneida Mukherjee PTA  6/16/2017

## 2017-06-19 ENCOUNTER — TELEPHONE (OUTPATIENT)
Dept: PHYSICAL THERAPY | Facility: HOSPITAL | Age: 42
End: 2017-06-19

## 2017-06-20 ENCOUNTER — HOSPITAL ENCOUNTER (OUTPATIENT)
Dept: PHYSICAL THERAPY | Facility: HOSPITAL | Age: 42
Setting detail: THERAPIES SERIES
End: 2017-06-20

## 2017-06-21 ENCOUNTER — APPOINTMENT (OUTPATIENT)
Dept: PHYSICAL THERAPY | Facility: HOSPITAL | Age: 42
End: 2017-06-21

## 2017-10-18 ENCOUNTER — DOCUMENTATION (OUTPATIENT)
Dept: PHYSICAL THERAPY | Facility: HOSPITAL | Age: 42
End: 2017-10-18

## 2017-10-18 DIAGNOSIS — Z96.651 STATUS POST TOTAL RIGHT KNEE REPLACEMENT: Primary | ICD-10-CM

## 2017-10-18 DIAGNOSIS — M25.561 ACUTE PAIN OF RIGHT KNEE: ICD-10-CM
